# Patient Record
Sex: FEMALE | Race: WHITE | NOT HISPANIC OR LATINO
[De-identification: names, ages, dates, MRNs, and addresses within clinical notes are randomized per-mention and may not be internally consistent; named-entity substitution may affect disease eponyms.]

---

## 2024-10-03 ENCOUNTER — DASHBOARD ENCOUNTERS (OUTPATIENT)
Age: 34
End: 2024-10-03

## 2024-10-03 ENCOUNTER — OFFICE VISIT (OUTPATIENT)
Dept: URBAN - METROPOLITAN AREA CLINIC 23 | Facility: CLINIC | Age: 34
End: 2024-10-03
Payer: COMMERCIAL

## 2024-10-03 VITALS
SYSTOLIC BLOOD PRESSURE: 103 MMHG | DIASTOLIC BLOOD PRESSURE: 71 MMHG | BODY MASS INDEX: 29.26 KG/M2 | HEIGHT: 65 IN | TEMPERATURE: 98.4 F | WEIGHT: 175.6 LBS | HEART RATE: 83 BPM

## 2024-10-03 DIAGNOSIS — R11.2 NAUSEA & VOMITING: ICD-10-CM

## 2024-10-03 DIAGNOSIS — R10.10 UPPER ABDOMINAL PAIN: ICD-10-CM

## 2024-10-03 DIAGNOSIS — K80.20 GALLSTONES: ICD-10-CM

## 2024-10-03 PROBLEM — 83132003: Status: ACTIVE | Noted: 2024-10-03

## 2024-10-03 PROBLEM — 235919008: Status: ACTIVE | Noted: 2024-10-03

## 2024-10-03 PROCEDURE — 99204 OFFICE O/P NEW MOD 45 MIN: CPT | Performed by: INTERNAL MEDICINE

## 2024-10-03 NOTE — HPI-TODAY'S VISIT:
- Presents with episodes of sharp pain on the right side under the breast, occurring every 2-3 weeks, lasting 30 minutes to 1.5 hours, associated with abdominal swelling, difficulty breathing, palpitations, and difficulty lying down ("The very sharp pain on the right side right under my breast. My stomach is really swollen. Looks like I'm pregnant. It's uncomfortable to, like, breathe. My heart starts pounding. Hard for me to even, like, lay down.") - Episodes are triggered by fatty foods and are increasing in frequency - Associated with nausea, vomiting, and pain radiating to the back of the chest - Denies blood in stool, hematemesis, melena, weight changes, or changes in appetite - Denies family history of intestinal issues - Denies any new prescription medications - Reports recent onset of severe headaches, eye swelling, and pressure behind the eyes (unrelated to current presentation)

## 2024-10-04 ENCOUNTER — LAB OUTSIDE AN ENCOUNTER (OUTPATIENT)
Dept: URBAN - METROPOLITAN AREA CLINIC 23 | Facility: CLINIC | Age: 34
End: 2024-10-04

## 2024-10-05 LAB
ALBUMIN/GLOBULIN RATIO: 1.8
ALBUMIN: 4
ALKALINE PHOSPHATASE: 55
ALT (SGPT): 15
AST (SGOT): 11
BILIRUBIN, DIRECT: 0.1
BILIRUBIN, INDIRECT: 0.2
BILIRUBIN, TOTAL: 0.3
GLOBULIN: 2.2
PROTEIN, TOTAL: 6.2

## 2024-10-14 ENCOUNTER — TELEPHONE ENCOUNTER (OUTPATIENT)
Dept: URBAN - METROPOLITAN AREA CLINIC 23 | Facility: CLINIC | Age: 34
End: 2024-10-14